# Patient Record
Sex: MALE | Race: WHITE | NOT HISPANIC OR LATINO | URBAN - METROPOLITAN AREA
[De-identification: names, ages, dates, MRNs, and addresses within clinical notes are randomized per-mention and may not be internally consistent; named-entity substitution may affect disease eponyms.]

---

## 2017-03-21 ENCOUNTER — FOLLOW UP ESTABLISHED (OUTPATIENT)
Dept: URBAN - METROPOLITAN AREA CLINIC 51 | Facility: CLINIC | Age: 82
End: 2017-03-21
Payer: COMMERCIAL

## 2017-03-21 PROCEDURE — 92134 CPTRZ OPH DX IMG PST SGM RTA: CPT | Performed by: OPHTHALMOLOGY

## 2017-03-21 PROCEDURE — 92014 COMPRE OPH EXAM EST PT 1/>: CPT | Performed by: OPHTHALMOLOGY

## 2017-03-21 ASSESSMENT — INTRAOCULAR PRESSURE: OD: 8

## 2017-07-18 ENCOUNTER — FOLLOW UP ESTABLISHED (OUTPATIENT)
Dept: URBAN - METROPOLITAN AREA CLINIC 51 | Facility: CLINIC | Age: 82
End: 2017-07-18
Payer: COMMERCIAL

## 2017-07-18 PROCEDURE — 92134 CPTRZ OPH DX IMG PST SGM RTA: CPT | Performed by: OPHTHALMOLOGY

## 2017-07-18 PROCEDURE — 92014 COMPRE OPH EXAM EST PT 1/>: CPT | Performed by: OPHTHALMOLOGY

## 2017-07-18 ASSESSMENT — INTRAOCULAR PRESSURE: OD: 10

## 2018-03-09 ENCOUNTER — FOLLOW UP ESTABLISHED (OUTPATIENT)
Dept: URBAN - METROPOLITAN AREA CLINIC 51 | Facility: CLINIC | Age: 83
End: 2018-03-09
Payer: OTHER GOVERNMENT

## 2018-03-09 DIAGNOSIS — H25.11 AGE-RELATED NUCLEAR CATARACT, RIGHT EYE: ICD-10-CM

## 2018-03-09 PROCEDURE — 92134 CPTRZ OPH DX IMG PST SGM RTA: CPT | Performed by: OPTOMETRIST

## 2018-03-09 PROCEDURE — 92014 COMPRE OPH EXAM EST PT 1/>: CPT | Performed by: OPTOMETRIST

## 2018-03-09 PROCEDURE — 92015 DETERMINE REFRACTIVE STATE: CPT | Performed by: OPTOMETRIST

## 2018-03-09 ASSESSMENT — KERATOMETRY: OD: 43.66

## 2018-03-09 ASSESSMENT — INTRAOCULAR PRESSURE: OD: 12

## 2018-03-09 ASSESSMENT — VISUAL ACUITY
OS: 20/NLP
OD: 20/150

## 2019-04-10 ENCOUNTER — FOLLOW UP ESTABLISHED (OUTPATIENT)
Dept: URBAN - METROPOLITAN AREA CLINIC 51 | Facility: CLINIC | Age: 84
End: 2019-04-10
Payer: COMMERCIAL

## 2019-04-10 DIAGNOSIS — C44.1222: ICD-10-CM

## 2019-04-10 DIAGNOSIS — H43.811 VITREOUS DEGENERATION, RIGHT EYE: ICD-10-CM

## 2019-04-10 DIAGNOSIS — H52.4 PRESBYOPIA: ICD-10-CM

## 2019-04-10 DIAGNOSIS — H35.3114 NEXDTVE AGE-REL MCLR DEGN, R EYE, ADV ATRPC W SBFVL INVOLV: Primary | ICD-10-CM

## 2019-04-10 DIAGNOSIS — H02.015 CICATRICIAL ENTROPION OF LEFT LOWER LID: ICD-10-CM

## 2019-04-10 PROCEDURE — 92014 COMPRE OPH EXAM EST PT 1/>: CPT | Performed by: OPTOMETRIST

## 2019-04-10 PROCEDURE — 92134 CPTRZ OPH DX IMG PST SGM RTA: CPT | Performed by: OPTOMETRIST

## 2019-04-10 ASSESSMENT — VISUAL ACUITY: OD: 20/800

## 2019-04-10 ASSESSMENT — INTRAOCULAR PRESSURE: OD: 12

## 2019-05-20 ENCOUNTER — FOLLOW UP ESTABLISHED (OUTPATIENT)
Dept: URBAN - METROPOLITAN AREA CLINIC 51 | Facility: CLINIC | Age: 84
End: 2019-05-20
Payer: COMMERCIAL

## 2019-05-20 PROCEDURE — 92285 EXTERNAL OCULAR PHOTOGRAPHY: CPT | Performed by: OPHTHALMOLOGY

## 2019-05-20 PROCEDURE — 99213 OFFICE O/P EST LOW 20 MIN: CPT | Performed by: OPHTHALMOLOGY

## 2019-05-20 ASSESSMENT — INTRAOCULAR PRESSURE: OD: 12

## 2019-10-22 ENCOUNTER — APPOINTMENT (RX ONLY)
Dept: URBAN - METROPOLITAN AREA CLINIC 157 | Facility: CLINIC | Age: 84
Setting detail: DERMATOLOGY
End: 2019-10-22

## 2019-10-22 VITALS — SYSTOLIC BLOOD PRESSURE: 106 MMHG | DIASTOLIC BLOOD PRESSURE: 56 MMHG

## 2019-10-22 PROBLEM — D04.112 CARCINOMA IN SITU OF SKIN OF RIGHT LOWER EYELID, INCLUDING CANTHUS: Status: ACTIVE | Noted: 2019-10-22

## 2019-10-22 PROCEDURE — 99202 OFFICE O/P NEW SF 15 MIN: CPT

## 2019-10-22 PROCEDURE — ? COUNSELING

## 2019-10-22 NOTE — PROCEDURE: COUNSELING
Patient Specific Counseling (Will Not Stick From Patient To Patient): I discussed the process of Mohs and that I thought this would be a relatively easy procedure with no risk to his vision.  We also discussed option of watchful observation and not to treat the lesion unless growth/change observation.  He verbalized understanding that untreated we are accepting risk of SCC progression.  I also noted that lesion has been stable over past year and that he is attentive and supported by family.  His daughter agrees to follow the lesion with him.  They prefer non-treatment at this time and will notify me if decides to proceed.
Detail Level: Detailed

## 2021-02-19 ENCOUNTER — FOLLOW UP ESTABLISHED (OUTPATIENT)
Dept: URBAN - METROPOLITAN AREA CLINIC 44 | Facility: CLINIC | Age: 86
End: 2021-02-19
Payer: COMMERCIAL

## 2021-02-19 DIAGNOSIS — H44.522 ATROPHY OF GLOBE, LEFT EYE: ICD-10-CM

## 2021-02-19 DIAGNOSIS — H02.035 SENILE ENTROPION OF LEFT LOWER EYELID: ICD-10-CM

## 2021-02-19 DIAGNOSIS — H25.811 COMBINED FORMS OF AGE-RELATED CATARACT, RIGHT EYE: Primary | ICD-10-CM

## 2021-02-19 PROCEDURE — 99213 OFFICE O/P EST LOW 20 MIN: CPT | Performed by: OPTOMETRIST

## 2021-02-19 PROCEDURE — 92134 CPTRZ OPH DX IMG PST SGM RTA: CPT | Performed by: OPTOMETRIST

## 2021-02-19 PROCEDURE — 92250 FUNDUS PHOTOGRAPHY W/I&R: CPT | Performed by: OPTOMETRIST

## 2021-02-19 ASSESSMENT — INTRAOCULAR PRESSURE: OD: 12

## 2021-02-19 ASSESSMENT — KERATOMETRY: OD: 43.63

## 2022-04-01 ENCOUNTER — OFFICE VISIT (OUTPATIENT)
Dept: URBAN - METROPOLITAN AREA CLINIC 44 | Facility: CLINIC | Age: 87
End: 2022-04-01
Payer: COMMERCIAL

## 2022-04-01 DIAGNOSIS — H04.123 TEAR FILM INSUFFICIENCY OF BILATERAL LACRIMAL GLANDS: ICD-10-CM

## 2022-04-01 PROCEDURE — 92134 CPTRZ OPH DX IMG PST SGM RTA: CPT | Performed by: OPTOMETRIST

## 2022-04-01 PROCEDURE — 92014 COMPRE OPH EXAM EST PT 1/>: CPT | Performed by: OPTOMETRIST

## 2022-04-01 ASSESSMENT — VISUAL ACUITY
OD: 20/250
OS: NLP

## 2022-04-01 ASSESSMENT — INTRAOCULAR PRESSURE: OD: 11

## 2022-04-01 ASSESSMENT — KERATOMETRY: OD: 43.75

## 2022-04-01 NOTE — IMPRESSION/PLAN
Impression: Combined forms of age-related cataract, right eye Visually significant/symptomatic cataracts. Vision limited by advance AMD with GA. Plan: PLAN: RTC 12 months for complete exam complete + OCT (Mac). RTC sooner if patient symptoms become worse.

## 2022-04-01 NOTE — IMPRESSION/PLAN
Impression: Phthisis bulbi of left eye
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  Eye reported as comfortable Plan: PLAN: Observe

## 2022-04-01 NOTE — IMPRESSION/PLAN
Impression: Nonexudative macular degeneration, advanced atrophic with subfoveal involvement, right eye. Per exam and OCT no SRF. Plan: PLAN: Discussed no Tobacco use and lifestyle changes and to decreased risk. Home monitoring with amsler grid. Rec low zinc formulation or no zinc formulation for eye vitamins to further reduce risk of progression. Discussed low vision options. RTC ASAP if notes and decreased vision or distortion in vision otherwise RTC 12 months for complete + possible OCT (Mac).

## 2023-04-03 ENCOUNTER — OFFICE VISIT (OUTPATIENT)
Dept: URBAN - METROPOLITAN AREA CLINIC 44 | Facility: CLINIC | Age: 88
End: 2023-04-03
Payer: COMMERCIAL

## 2023-04-03 DIAGNOSIS — H35.3114 NONEXUDATIVE MACULAR DEGENERATION, ADVANCED ATROPHIC WITH SUBFOVEAL INVOLVEMENT, RIGHT EYE: Primary | ICD-10-CM

## 2023-04-03 DIAGNOSIS — H04.123 TEAR FILM INSUFFICIENCY OF BILATERAL LACRIMAL GLANDS: ICD-10-CM

## 2023-04-03 DIAGNOSIS — H44.522 ATROPHY OF GLOBE, LEFT EYE: ICD-10-CM

## 2023-04-03 DIAGNOSIS — H25.811 COMBINED FORMS OF AGE-RELATED CATARACT, RIGHT EYE: ICD-10-CM

## 2023-04-03 PROCEDURE — 92134 CPTRZ OPH DX IMG PST SGM RTA: CPT | Performed by: OPTOMETRIST

## 2023-04-03 PROCEDURE — 92014 COMPRE OPH EXAM EST PT 1/>: CPT | Performed by: OPTOMETRIST

## 2023-04-03 ASSESSMENT — KERATOMETRY: OD: 45.63

## 2023-04-03 ASSESSMENT — INTRAOCULAR PRESSURE: OD: 12

## 2023-04-03 NOTE — IMPRESSION/PLAN
Impression: Nonexudative macular degeneration, advanced atrophic with subfoveal involvement, right eye. Plan: PLAN:  Rec low zinc formulation or no zinc formulation for eye vitamins to further reduce risk of progression. Discussed low vision options. RTC ASAP if notes and decreased vision or distortion in vision otherwise RTC 12 months for complete + OCT (Mac).

## 2023-04-03 NOTE — IMPRESSION/PLAN
Impression: Phthisis bulbi of left eye
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  Eye reported as comfortable and stable Plan: PLAN: Observe

## 2023-04-03 NOTE — IMPRESSION/PLAN
Impression: Tear film insufficiency of bilateral lacrimal glands: H04.123. Clinical evaluation shows mild DED signs Plan: PLAN: Recommend Lipid based tears to be used 3-4X daily if symptomatic. PRN if no symptoms. Observe condition and RTC if symptom's worsen.

## 2023-04-03 NOTE — IMPRESSION/PLAN
Impression: Combined forms of age-related cataract, right eye Visually significant/symptomatic cataracts but vision limited by advance AMD with GA. Plan: PLAN: RTC 12 months for complete exam complete. RTC sooner if patient symptoms become worse.